# Patient Record
Sex: MALE | Race: WHITE | NOT HISPANIC OR LATINO | Employment: OTHER | URBAN - METROPOLITAN AREA
[De-identification: names, ages, dates, MRNs, and addresses within clinical notes are randomized per-mention and may not be internally consistent; named-entity substitution may affect disease eponyms.]

---

## 2024-08-15 ENCOUNTER — OFFICE VISIT (OUTPATIENT)
Dept: ORTHOPEDIC SURGERY | Facility: CLINIC | Age: 79
End: 2024-08-15
Payer: MEDICARE

## 2024-08-15 VITALS
SYSTOLIC BLOOD PRESSURE: 160 MMHG | BODY MASS INDEX: 27.39 KG/M2 | WEIGHT: 202.2 LBS | DIASTOLIC BLOOD PRESSURE: 68 MMHG | HEIGHT: 72 IN

## 2024-08-15 DIAGNOSIS — M75.112 INCOMPLETE TEAR OF LEFT ROTATOR CUFF, UNSPECIFIED WHETHER TRAUMATIC: Primary | ICD-10-CM

## 2024-08-15 DIAGNOSIS — M19.012 PRIMARY OSTEOARTHRITIS OF LEFT SHOULDER: ICD-10-CM

## 2024-08-15 DIAGNOSIS — M25.512 LEFT SHOULDER PAIN, UNSPECIFIED CHRONICITY: ICD-10-CM

## 2024-08-15 DIAGNOSIS — M19.012 ARTHRITIS OF LEFT ACROMIOCLAVICULAR JOINT: ICD-10-CM

## 2024-08-15 RX ORDER — LEVOCETIRIZINE DIHYDROCHLORIDE 5 MG/1
1 TABLET, FILM COATED ORAL DAILY
COMMUNITY

## 2024-08-15 RX ORDER — LIDOCAINE HYDROCHLORIDE 10 MG/ML
3 INJECTION, SOLUTION EPIDURAL; INFILTRATION; INTRACAUDAL; PERINEURAL
Status: COMPLETED | OUTPATIENT
Start: 2024-08-15 | End: 2024-08-15

## 2024-08-15 RX ORDER — FLUTICASONE PROPIONATE 50 MCG
1 SPRAY, SUSPENSION (ML) NASAL DAILY
COMMUNITY

## 2024-08-15 RX ORDER — FAMOTIDINE 20 MG/1
TABLET, FILM COATED ORAL
COMMUNITY
Start: 2023-09-25

## 2024-08-15 RX ORDER — MOMETASONE FUROATE MONOHYDRATE 50 UG/1
SPRAY, METERED NASAL DAILY
COMMUNITY
Start: 2024-04-03

## 2024-08-15 RX ORDER — WARFARIN SODIUM 2.5 MG/1
TABLET ORAL
COMMUNITY
Start: 2024-01-16

## 2024-08-15 RX ORDER — ALBUTEROL SULFATE 90 UG/1
AEROSOL, METERED RESPIRATORY (INHALATION)
COMMUNITY
Start: 2024-04-22

## 2024-08-15 RX ORDER — TRIAMCINOLONE ACETONIDE 40 MG/ML
40 INJECTION, SUSPENSION INTRA-ARTICULAR; INTRAMUSCULAR
Status: COMPLETED | OUTPATIENT
Start: 2024-08-15 | End: 2024-08-15

## 2024-08-15 RX ORDER — CARVEDILOL 25 MG/1
TABLET ORAL
COMMUNITY
Start: 2024-03-19

## 2024-08-15 RX ORDER — TIMOLOL MALEATE 6.8 MG/ML
SOLUTION/ DROPS OPHTHALMIC 2 TIMES DAILY
COMMUNITY

## 2024-08-15 RX ADMIN — TRIAMCINOLONE ACETONIDE 40 MG: 40 INJECTION, SUSPENSION INTRA-ARTICULAR; INTRAMUSCULAR at 08:36

## 2024-08-15 RX ADMIN — LIDOCAINE HYDROCHLORIDE 3 ML: 10 INJECTION, SOLUTION EPIDURAL; INFILTRATION; INTRACAUDAL; PERINEURAL at 08:36

## 2024-08-15 NOTE — PROGRESS NOTES
[facility] [place] Orthopa[facility] Surgery Clinic Note        Subjective     Pain of the Left Shoulder      HPI    John Lozano is a 79 y.o. male.  Patient is here today see me for the first time in quite some time for evaluation of a left shoulder injury.  Patient tells me that he injured the left shoulder on 2024 when he caught a 12 foot 2 x 4 that was falling.  Knew immediately that he had injured the shoulder.  He is right-hand dominant.  He has anterior and anterior lateral shoulder pain.  No biceps muscle belly pain.  Has done 4-5 sessions with physical therapy.  He says that his pain is unpredictable.  Initially had trouble sleeping but he is now able to sleep.  He is here for further evaluation and treatment at the request of Shala Higgins.        Past Medical History:   Diagnosis Date    Arthritis of neck     Cervical disc disorder     Knee swelling     Periarthritis of shoulder     Rotator cuff syndrome       History reviewed. No pertinent surgical history.   Family History   Problem Relation Age of Onset    Cancer Brother             Cancer Brother              Social History     Socioeconomic History    Marital status:    Tobacco Use    Smoking status: Never    Smokeless tobacco: Never   Vaping Use    Vaping status: Never Used   Substance and Sexual Activity    Alcohol use: Not Currently    Drug use: Never    Sexual activity: Not Currently     Partners: Female     Birth control/protection: Abstinence     Comment: To old      Current Outpatient Medications on File Prior to Visit   Medication Sig Dispense Refill    albuterol sulfate  (90 Base) MCG/ACT inhaler Every 4 To 6 Hours As Needed as needed for Cough as needed for Cough      carvedilol (COREG) 25 MG tablet       Cholecalciferol 50 MCG (2000 UT) tablet Take 1 tablet by mouth Daily.      famotidine (PEPCID) 20 MG tablet       fluticasone (FLONASE) 50 MCG/ACT nasal spray 1 spray into the nostril(s) as directed by provider Daily.   "    levocetirizine (XYZAL) 5 MG tablet Take 1 tablet by mouth Daily.      mometasone (NASONEX) 50 MCG/ACT nasal spray Daily.      Timolol Maleate, Once-Daily, 0.5 % solution 2 (Two) Times a Day.      warfarin (COUMADIN) 2.5 MG tablet        No current facility-administered medications on file prior to visit.      Allergies   Allergen Reactions    Alfuzosin Rash    Amoxicillin Rash    Doxazosin Rash    Sulfa Antibiotics Rash    Tamsulosin Urinary Retention          Review of Systems   Constitutional: Negative.    HENT: Negative.     Eyes: Negative.    Respiratory: Negative.     Cardiovascular: Negative.    Gastrointestinal: Negative.    Endocrine: Negative.    Genitourinary: Negative.    Musculoskeletal:  Positive for arthralgias.   Skin: Negative.    Allergic/Immunologic: Negative.    Neurological: Negative.    Hematological: Negative.    Psychiatric/Behavioral: Negative.          I reviewed the patient's chief complaint, history of present illness, review of systems, past medical history, surgical history, family history, social history, medications and allergy list.        Objective      Physical Exam  /68   Ht 181.6 cm (71.5\")   Wt 91.7 kg (202 lb 3.2 oz)   BMI 27.81 kg/m²     Body mass index is 27.81 kg/m².    General  Mental Status - alert  General Appearance - cooperative, well groomed, not in acute distress  Orientation - Oriented X3  Build & Nutrition - well developed and well nourished  Posture - normal posture  Gait - normal gait       Ortho Exam  Musculoskeletal   Upper Extremity   Left Shoulder       Strength and Tone:    Supraspinatus -4-5 without pain    External Umfnebnp-3-5 without pain    Infraspinatus - 5/5    Subscapularis - 5/5    Deltoid - 5/5     Range of Motion      Left Shoulder:    Internal Rotation: ROM - L4    External Rotation: AROM - 70 degrees    Elevation through flexion: AROM - 140 degrees     AC joint:  non tender to palpation    AC joint:  negative " crossover        Imaging/Studies Reviewed and Interpreted:  Imaging Results (Last 24 Hours)       ** No results found for the last 24 hours. **          We have reviewed and interpreted both an x-ray and an MRI brought in with the patient.  Date of the x-ray and MRI are 730 and 813 both in 2024.  X-ray shows degenerative changes in the glenohumeral joint.  No proximal migration.  Hypertrophic AC joint arthritis is noted.  On the MRI, there is fluid around the long of the biceps.  Arthritis is appreciated as well.  He also has a near full-thickness tear of the supraspinatus.  Subscap appears to be intact.    Assessment    Assessment:  1. Incomplete tear of left rotator cuff, unspecified whether traumatic    2. Left shoulder pain, unspecified chronicity    3. Arthritis of left acromioclavicular joint    4. Primary osteoarthritis of left shoulder        Plan:  Continue over-the-counter medication as needed for discomfort  Left shoulder injury with AC joint arthritis and glenohumeral arthritis--diagnostic and therapeutic injection will be given into the glenohumeral joint today.  Follow-up in about 2 months to assess efficacy.  Will get him back into physical therapy for strengthening of the rotator cuff as well.  Partial and near full-thickness tear of the supraspinatus--physical therapy will be requested.  Patient is can to be reevaluated in 2 months if he is not a lot better, consider modalities to address his rotator cuff.      Procedure Note:    I discussed with the patient the potential benefits of performing a therapeutic injections as well as potential risks including but not limited to infection, swelling, pain, bleeding, bruising, nerve/vessel damage, skin color changes, transient elevation in blood glucose levels, and fat atrophy. After informed consent and after the areas were prepped with chlorhexadine soap, ethyl chloride was used to numb the skin. Via the anterior approach, 3mL of 1% lidocaine followed by  40mg of Kenalog were each injected into the glenohumeral joint of the left shoulder. The patient tolerated the procedure well. There were no complications. A sterile dressing was placed over the injection sites.          Jett Liang MD  08/15/24  08:38 EDT      Dictated Utilizing Dragon Dictation.

## 2024-08-15 NOTE — PROGRESS NOTES
Procedure   - Large Joint Arthrocentesis: L glenohumeral on 8/15/2024 8:36 AM  Indications: pain  Details: 21 G needle, posterior approach  Medications: 3 mL lidocaine PF 1% 1 %; 40 mg triamcinolone acetonide 40 MG/ML  Outcome: tolerated well, no immediate complications  Procedure, treatment alternatives, risks and benefits explained, specific risks discussed. Consent was given by the patient. Immediately prior to procedure a time out was called to verify the correct patient, procedure, equipment, support staff and site/side marked as required. Patient was prepped and draped in the usual sterile fashion.

## 2024-09-03 DIAGNOSIS — M25.512 LEFT SHOULDER PAIN, UNSPECIFIED CHRONICITY: ICD-10-CM

## 2024-09-03 DIAGNOSIS — M19.012 PRIMARY OSTEOARTHRITIS OF LEFT SHOULDER: ICD-10-CM

## 2024-09-03 DIAGNOSIS — M75.112 INCOMPLETE TEAR OF LEFT ROTATOR CUFF, UNSPECIFIED WHETHER TRAUMATIC: ICD-10-CM

## 2024-09-03 DIAGNOSIS — M19.012 ARTHRITIS OF LEFT ACROMIOCLAVICULAR JOINT: Primary | ICD-10-CM

## 2024-10-15 ENCOUNTER — OFFICE VISIT (OUTPATIENT)
Dept: ORTHOPEDIC SURGERY | Facility: CLINIC | Age: 79
End: 2024-10-15
Payer: MEDICARE

## 2024-10-15 VITALS
DIASTOLIC BLOOD PRESSURE: 74 MMHG | BODY MASS INDEX: 28.31 KG/M2 | HEIGHT: 72 IN | SYSTOLIC BLOOD PRESSURE: 130 MMHG | WEIGHT: 209 LBS

## 2024-10-15 DIAGNOSIS — M25.512 LEFT SHOULDER PAIN, UNSPECIFIED CHRONICITY: ICD-10-CM

## 2024-10-15 DIAGNOSIS — M75.112 INCOMPLETE TEAR OF LEFT ROTATOR CUFF, UNSPECIFIED WHETHER TRAUMATIC: Primary | ICD-10-CM

## 2024-10-15 DIAGNOSIS — M19.012 ARTHRITIS OF LEFT ACROMIOCLAVICULAR JOINT: ICD-10-CM

## 2024-10-15 DIAGNOSIS — M19.012 PRIMARY OSTEOARTHRITIS OF LEFT SHOULDER: ICD-10-CM

## 2024-10-15 PROCEDURE — 99212 OFFICE O/P EST SF 10 MIN: CPT | Performed by: ORTHOPAEDIC SURGERY

## 2024-10-15 RX ORDER — NIFEDIPINE 30 MG/1
60 TABLET, EXTENDED RELEASE ORAL DAILY
COMMUNITY
Start: 2024-08-16 | End: 2025-08-16

## 2024-10-15 RX ORDER — DESLORATADINE 5 MG/1
TABLET ORAL
COMMUNITY
Start: 2024-10-10

## 2024-10-15 RX ORDER — TIMOLOL MALEATE 5 MG/ML
SOLUTION/ DROPS OPHTHALMIC
COMMUNITY
Start: 2024-09-19

## 2024-10-15 NOTE — PROGRESS NOTES
"    [facility] [place] [facility] Note        Subjective     CC: Follow-up (2 month follow up-Pain of the Left Shoulder)      HPI    John Lozano is a 79 y.o. male.  Patient is here today for follow-up of his left shoulder.  He has AC joint arthritis and glenohumeral arthritis as well as a partial and near full-thickness tear of the supraspinatus.  He has stopped his physical therapy exercises and seems to be doing quite well overall.  He was injected in the glenohumeral joint at his initial visit on 8/15/2024.    Overall, patient's symptoms are much improved    ROS:    Constiutional:Pt denies fever, chills, nausea, or vomiting.  MSK:as above        Objective      Past Medical History  Past Medical History:   Diagnosis Date    Arthritis of back     Arthritis of neck     Cervical disc disorder     Knee swelling     Low back strain     Lumbosacral disc disease     Periarthritis of shoulder     Rotator cuff syndrome      Social History     Socioeconomic History    Marital status:    Tobacco Use    Smoking status: Never    Smokeless tobacco: Never   Vaping Use    Vaping status: Never Used   Substance and Sexual Activity    Alcohol use: Not Currently     Comment: never drank much, just when dinning out & stopped in my 30's    Drug use: Never    Sexual activity: Not Currently     Partners: Female     Birth control/protection: Abstinence     Comment: To old          Physical Exam  /74   Ht 181.6 cm (71.5\")   Wt 94.8 kg (209 lb)   BMI 28.74 kg/m²     Body mass index is 28.74 kg/m².    Patient is well nourished and well developed.        Ortho Exam  Musculoskeletal   Upper Extremity   Left Shoulder       Strength and Tone:    Supraspinatus -5 out of 5    External Rotators-5/5    Infraspinatus - 5/5    Subscapularis - 5/5    Deltoid - 5/5     Range of Motion      Left Shoulder:    Internal Rotation: ROM - L4    External Rotation: AROM - 70 degrees    Elevation through flexion: AROM - 140 degrees     AC " joint:  non tender to palpation    AC joint:  negative crossover        Imaging/Labs/EMG Reviewed and Interpreted:  Imaging Results (Last 24 Hours)       ** No results found for the last 24 hours. **              Assessment    Assessment:  1. Incomplete tear of left rotator cuff, unspecified whether traumatic    2. Left shoulder pain, unspecified chronicity    3. Arthritis of left acromioclavicular joint    4. Primary osteoarthritis of left shoulder        Plan:  Recommend over the counter anti-inflammatories for pain and/or swelling  Partial-thickness rotator cuff tear in the face of glenohumeral arthritis and AC joint arthritis--patient is better after glenohumeral injection.  I have suggested he continue on his home exercises and modify his activity appropriately.  I will see him back as needed going forward.      Jett Liang MD  10/15/24  12:19 EDT      Dictated Utilizing Dragon Dictation.

## 2025-01-09 ENCOUNTER — OFFICE VISIT (OUTPATIENT)
Dept: ORTHOPEDIC SURGERY | Facility: CLINIC | Age: 80
End: 2025-01-09
Payer: MEDICARE

## 2025-01-09 VITALS
DIASTOLIC BLOOD PRESSURE: 82 MMHG | WEIGHT: 220 LBS | HEIGHT: 71 IN | BODY MASS INDEX: 30.8 KG/M2 | SYSTOLIC BLOOD PRESSURE: 138 MMHG

## 2025-01-09 DIAGNOSIS — M75.101 ROTATOR CUFF SYNDROME OF RIGHT SHOULDER: ICD-10-CM

## 2025-01-09 DIAGNOSIS — M19.011 ARTHRITIS OF RIGHT ACROMIOCLAVICULAR JOINT: ICD-10-CM

## 2025-01-09 DIAGNOSIS — S49.91XA INJURY OF RIGHT SHOULDER, INITIAL ENCOUNTER: ICD-10-CM

## 2025-01-09 DIAGNOSIS — M25.511 RIGHT SHOULDER PAIN, UNSPECIFIED CHRONICITY: Primary | ICD-10-CM

## 2025-01-09 RX ORDER — CYANOCOBALAMIN 500 UG/1
1000 SPRAY, METERED NASAL
COMMUNITY
Start: 2024-08-20

## 2025-01-09 RX ORDER — TRIAMCINOLONE ACETONIDE 40 MG/ML
40 INJECTION, SUSPENSION INTRA-ARTICULAR; INTRAMUSCULAR
Status: COMPLETED | OUTPATIENT
Start: 2025-01-09 | End: 2025-01-09

## 2025-01-09 RX ORDER — LIDOCAINE HYDROCHLORIDE 10 MG/ML
3 INJECTION, SOLUTION EPIDURAL; INFILTRATION; INTRACAUDAL; PERINEURAL
Status: COMPLETED | OUTPATIENT
Start: 2025-01-09 | End: 2025-01-09

## 2025-01-09 RX ADMIN — TRIAMCINOLONE ACETONIDE 40 MG: 40 INJECTION, SUSPENSION INTRA-ARTICULAR; INTRAMUSCULAR at 15:22

## 2025-01-09 RX ADMIN — LIDOCAINE HYDROCHLORIDE 3 ML: 10 INJECTION, SOLUTION EPIDURAL; INFILTRATION; INTRACAUDAL; PERINEURAL at 15:22

## 2025-01-09 NOTE — PROGRESS NOTES
[facility] [place] Orthopa[facility] Surgery Clinic Note        Subjective     Pain and Initial Evaluation of the Right Shoulder      HPI    John Lozano is a 79 y.o. male.  Patient is here today for new problem today regarding his right shoulder.  He says he was moving and a glue shaped dog house to the side 2 days ago.  He went to bed that night and had severe right shoulder pain.  Considered going to the ER.  He hurts at the top of the shoulder and anterolaterally.  Says he feels like he is done something to his rotator cuff.          Past Medical History:   Diagnosis Date    Arthritis of back     Arthritis of neck     Cervical disc disorder     Knee swelling     Low back strain     Lumbosacral disc disease     Periarthritis of shoulder     Rotator cuff syndrome       History reviewed. No pertinent surgical history.   Family History   Problem Relation Age of Onset    Cancer Brother             Cancer Brother              Social History     Socioeconomic History    Marital status:    Tobacco Use    Smoking status: Never    Smokeless tobacco: Never   Vaping Use    Vaping status: Never Used   Substance and Sexual Activity    Alcohol use: Not Currently     Comment: never drank much, just when dinning out & stopped in my 30's    Drug use: Never    Sexual activity: Not Currently     Partners: Female     Birth control/protection: Abstinence     Comment: To old      Current Outpatient Medications on File Prior to Visit   Medication Sig Dispense Refill    albuterol sulfate  (90 Base) MCG/ACT inhaler Every 4 To 6 Hours As Needed as needed for Cough as needed for Cough      carvedilol (COREG) 25 MG tablet       Cholecalciferol 50 MCG (2000 UT) tablet Take 1 tablet by mouth Daily.      Cyanocobalamin 500 MCG/0.1ML solution 0.2 mL.      desloratadine (CLARINEX) 5 MG tablet       famotidine (PEPCID) 20 MG tablet       fluticasone (FLONASE) 50 MCG/ACT nasal spray Administer 1 spray into the nostril(s) as  "directed by provider Daily.      NIFEdipine XL (PROCARDIA XL) 30 MG 24 hr tablet Take 2 tablets by mouth Daily.      timolol (TIMOPTIC) 0.5 % ophthalmic solution       Timolol Maleate, Once-Daily, 0.5 % solution 2 (Two) Times a Day.      warfarin (COUMADIN) 2.5 MG tablet        No current facility-administered medications on file prior to visit.      Allergies   Allergen Reactions    Alfuzosin Rash    Amoxicillin Rash    Doxazosin Rash    Sulfa Antibiotics Rash    Tamsulosin Urinary Retention          Review of Systems   Constitutional: Negative.    HENT: Negative.     Eyes: Negative.    Respiratory: Negative.     Cardiovascular: Negative.    Gastrointestinal: Negative.    Endocrine: Negative.    Genitourinary: Negative.    Musculoskeletal:  Positive for arthralgias.   Skin: Negative.    Allergic/Immunologic: Negative.    Neurological: Negative.    Hematological: Negative.    Psychiatric/Behavioral: Negative.          I reviewed the patient's chief complaint, history of present illness, review of systems, past medical history, surgical history, family history, social history, medications and allergy list.        Objective      Physical Exam  /82   Ht 181.6 cm (71.5\")   Wt 99.8 kg (220 lb)   BMI 30.26 kg/m²     Body mass index is 30.26 kg/m².    General  Mental Status - alert  General Appearance - cooperative, well groomed, not in acute distress  Orientation - Oriented X3  Build & Nutrition - well developed and well nourished  Posture - normal posture  Gait - normal gait       Ortho Exam  Musculoskeletal   Upper Extremity   Right Shoulder       Strength and Tone:    Supraspinatus -4-5 with pain    External Rotators-5/5 with pain    Infraspinatus - 5/5    Subscapularis - 5/5    Deltoid - 5/5     Range of Motion      Right Shoulder:    Internal Rotation: ROM - L4    External Rotation: AROM - 70 degrees    Elevation through flexion: AROM - 140 degrees     AC joint: Mildly tender to palpation    AC joint: " Positive crossover      Imaging/Studies Reviewed and Interpreted:  Imaging Results (Last 24 Hours)       Procedure Component Value Units Date/Time    XR Shoulder 2+ View Right [[id number]] Resulted: 01/09/25 1511     Updated: 01/09/25 1511    Narrative:      Right Shoulder X-Ray    Indication: Pain    Study:  Grashey AP, axillary lateral, and scapular Y views    Comparison: None    Findings:  No acute fractures are visualized  No bony lesions are visualized.  Normal soft tissue appearance  AC joint: Severe hypertrophic joint space narrowing  Glenohumeral joint: Mild joint space narrowing  Acromion type: 1      Impression:    No acute bony abnormalities noted  Type I acromion  Severe hypertrophic AC joint space narrowing              Assessment    Assessment:  1. Right shoulder pain, unspecified chronicity    2. Injury of right shoulder, initial encounter    3. Arthritis of right acromioclavicular joint    4. Rotator cuff syndrome of right shoulder        Plan:  Continue over-the-counter medication as needed for discomfort  Right shoulder injury in the face of underlying AC joint arthritis--because of the acuity, we offered to order an MRI.  He prefers to go with an injection and some home exercises for now.  Will try that and I will see him back in 6 weeks.      Procedure Note:    I discussed with the patient the potential benefits of performing a therapeutic injections as well as potential risks including but not limited to infection, swelling, pain, bleeding, bruising, nerve/vessel damage, skin color changes, transient elevation in blood glucose levels, and fat atrophy. After informed consent and after the areas were prepped with chlorhexadine soap, ethyl chloride was used to numb the skin. Via the posterolateral approach, 3mL of 1% lidocaine followed by 40mg of Kenalog were each injected into the subacromial space of the right shoulder. The patient tolerated the procedure well. There were no complications. A  sterile dressing was placed over the injection sites.          Jett Liang MD  01/09/25  15:25 EST      Dictated Utilizing Dragon Dictation.

## 2025-01-09 NOTE — PROGRESS NOTES
Procedure   - Large Joint Arthrocentesis: R subacromial bursa on 1/9/2025 3:22 PM  Indications: pain  Details: 25 G needle, posterior approach  Medications: 3 mL lidocaine PF 1% 1 %; 40 mg triamcinolone acetonide 40 MG/ML  Outcome: tolerated well, no immediate complications  Procedure, treatment alternatives, risks and benefits explained, specific risks discussed. Consent was given by the patient. Immediately prior to procedure a time out was called to verify the correct patient, procedure, equipment, support staff and site/side marked as required. Patient was prepped and draped in the usual sterile fashion.

## 2025-02-20 ENCOUNTER — OFFICE VISIT (OUTPATIENT)
Dept: ORTHOPEDIC SURGERY | Facility: CLINIC | Age: 80
End: 2025-02-20
Payer: MEDICARE

## 2025-02-20 VITALS
DIASTOLIC BLOOD PRESSURE: 74 MMHG | BODY MASS INDEX: 30.21 KG/M2 | WEIGHT: 215.8 LBS | SYSTOLIC BLOOD PRESSURE: 118 MMHG | HEIGHT: 71 IN

## 2025-02-20 DIAGNOSIS — M25.511 RIGHT SHOULDER PAIN, UNSPECIFIED CHRONICITY: Primary | ICD-10-CM

## 2025-02-20 DIAGNOSIS — S49.91XD INJURY OF RIGHT SHOULDER, SUBSEQUENT ENCOUNTER: ICD-10-CM

## 2025-02-20 NOTE — PROGRESS NOTES
"    [facility] [place] Orthopedic Surgery Clinic Note        Subjective     CC: Follow-up (6 week follow up---Right shoulder pain, unspecified chronicity)      HPI    John Lozano is a 79 y.o. male.  Patient returns the office today for follow-up of his right shoulder issue.  Last seen on 1/9/2025 and he was injected subacromially.  He says this helped him but then he sustained another injury where he reached out suddenly and has now had a recurrence of his discomfort.    Overall, patient's symptoms are as above    ROS:    Constiutional:Pt denies fever, chills, nausea, or vomiting.  MSK:as above        Objective      Past Medical History  Past Medical History:   Diagnosis Date    Arthritis of back     Arthritis of neck     Cervical disc disorder     Knee swelling     Low back strain     Lumbosacral disc disease     Periarthritis of shoulder     Rotator cuff syndrome      Social History     Socioeconomic History    Marital status:    Tobacco Use    Smoking status: Never    Smokeless tobacco: Never   Vaping Use    Vaping status: Never Used   Substance and Sexual Activity    Alcohol use: Not Currently     Comment: never drank much, just when dinning out & stopped in my 30's    Drug use: Never    Sexual activity: Not Currently     Partners: Female     Birth control/protection: Abstinence     Comment: To old          Physical Exam  /74   Ht 181.6 cm (71.5\")   Wt 97.9 kg (215 lb 12.8 oz)   BMI 29.68 kg/m²     Body mass index is 29.68 kg/m².    Patient is well nourished and well developed.        Ortho Exam  Musculoskeletal   Upper Extremity   Right Shoulder       Strength and Tone:    Supraspinatus -4-5 with pain    External Rotators-5/5 with pain    Infraspinatus - 5/5    Subscapularis - 5/5    Deltoid - 5/5     Range of Motion      Right Shoulder:    Internal Rotation: ROM - L4    External Rotation: AROM - 70 degrees    Elevation through flexion: AROM - 140 degrees     AC joint:  non tender to palpation    AC " joint:  negative crossover      Imaging/Labs/EMG Reviewed and Interpreted:  Imaging Results (Last 24 Hours)       ** No results found for the last 24 hours. **              Assessment    Assessment:  1. Right shoulder pain, unspecified chronicity    2. Injury of right shoulder, subsequent encounter        Plan:  Recommend over the counter anti-inflammatories for pain and/or swelling  Right shoulder injury--at this point, patient will need an MRI to evaluate the extent of his injury.  He has a significant VTE history and we need to remember this going forward as we plan treatment.      Jett Liang MD  02/20/25  17:06 EST      Dictated Utilizing Dragon Dictation.

## 2025-03-08 ENCOUNTER — HOSPITAL ENCOUNTER (OUTPATIENT)
Dept: MRI IMAGING | Facility: HOSPITAL | Age: 80
Discharge: HOME | End: 2025-03-08
Payer: MEDICARE

## 2025-03-08 DIAGNOSIS — S49.91XD INJURY OF RIGHT SHOULDER, SUBSEQUENT ENCOUNTER: ICD-10-CM

## 2025-03-08 DIAGNOSIS — M25.511 RIGHT SHOULDER PAIN, UNSPECIFIED CHRONICITY: ICD-10-CM

## 2025-03-08 PROCEDURE — 73221 MRI JOINT UPR EXTREM W/O DYE: CPT

## 2025-03-13 ENCOUNTER — OFFICE VISIT (OUTPATIENT)
Dept: ORTHOPEDIC SURGERY | Facility: CLINIC | Age: 80
End: 2025-03-13
Payer: MEDICARE

## 2025-03-13 VITALS
HEIGHT: 72 IN | WEIGHT: 215 LBS | SYSTOLIC BLOOD PRESSURE: 156 MMHG | BODY MASS INDEX: 29.12 KG/M2 | DIASTOLIC BLOOD PRESSURE: 88 MMHG

## 2025-03-13 DIAGNOSIS — S46.011D STRAIN OF TENDON OF RIGHT ROTATOR CUFF, SUBSEQUENT ENCOUNTER: Primary | ICD-10-CM

## 2025-03-13 DIAGNOSIS — S46.011D TRAUMATIC COMPLETE TEAR OF RIGHT ROTATOR CUFF, SUBSEQUENT ENCOUNTER: Primary | ICD-10-CM

## 2025-03-13 NOTE — PROGRESS NOTES
"    [facility] [place] Orthopedic Surgery Clinic Note        Subjective     CC: Follow-up (3 week follow up -Right shoulder pain, unspecified chronicity -MRI preformed 3/8/25/)      HPI    John Lozano is a 79 y.o. male.  Patient returns the office today for follow-up after the MRI of his right shoulder.  Continues to complain of pain more than weakness.    Overall, patient's symptoms are as above    ROS:    Constiutional:Pt denies fever, chills, nausea, or vomiting.  MSK:as above        Objective      Past Medical History  Past Medical History:   Diagnosis Date    Arthritis of back     Arthritis of neck     Cervical disc disorder     Frozen shoulder     Knee swelling     Low back strain     Lumbosacral disc disease     Periarthritis of shoulder     Rotator cuff syndrome      Social History     Socioeconomic History    Marital status:    Tobacco Use    Smoking status: Never    Smokeless tobacco: Never   Vaping Use    Vaping status: Never Used   Substance and Sexual Activity    Alcohol use: Not Currently     Comment: never drank much, just when dinning out & stopped in my 30's    Drug use: Never    Sexual activity: Not Currently     Partners: Female     Birth control/protection: Abstinence     Comment: To old          Physical Exam  /88   Ht 181.6 cm (71.5\")   Wt 97.5 kg (215 lb)   BMI 29.57 kg/m²     Body mass index is 29.57 kg/m².    Patient is well nourished and well developed.        Ortho Exam  Musculoskeletal   Upper Extremity   Right Shoulder       Strength and Tone:    Supraspinatus -4 out of 5 with pain    External Rotators-5/5    Infraspinatus - 5/5    Subscapularis - 5/5    Deltoid - 5/5     Range of Motion      Right Shoulder:    Internal Rotation: ROM - L4    External Rotation: AROM - 70 degrees    Elevation through flexion: AROM - 140 degrees     AC joint:  non tender to palpation    AC joint:  negative crossover      Imaging/Labs/EMG Reviewed and Interpreted:  Imaging Results (Last 24 Hours)  "      ** No results found for the last 24 hours. **            MRI Shoulder Right Without Contrast  Narrative: MRI SHOULDER RIGHT WO CONTRAST    Date of Exam: 3/8/2025 5:01 PM EST    Indication: Shoulder pain, rotator cuff disorder suspected, xray done  pain.     Comparison: Shoulder x-ray 1/9/2025    Technique:  Routine multiplanar/multisequence images of the right shoulder were obtained without contrast administration.      Findings:  Rotator cuff:  Supraspinatus and infraspinatus tendon: There is full-thickness full width complete tearing of the infraspinatus tendon. There is full-thickness near full width tearing of the supraspinatus tendon. A few poor quality far anterior supraspinatus tendon   fibers are intact however majority is torn. This is at the insertion site. This tear measures 4.1 cm mediolateral by 3.8 cm anterior to posterior. There is medial retraction musculotendinous junction measuring 1.6 cm. There is moderate to severe atrophy   infraspinatus muscle with mild edema. Mild supraspinatus muscle atrophy.  Subscapularis tendon: There is an intrasubstance tear measuring 1.4 x 1.2 cm. This involves about 50% tendon thickness. There is severe tendinosis. Moderate muscle atrophy without significant edema.  Teres minor tendon: No tendon abnormality. No significant muscle atrophy.    Glenohumeral joint:  Humeral head is superiorly located in the glenoid. There is a large glenohumeral joint effusion with synovitis. There is severe joint space narrowing. Inferior joint capsule has normal thickness and signal intensity.    Labrum:  Diffuse degenerative tearing of the labrum. No large paralabral cysts.    Biceps tendon:  The long head biceps tendon is abnormal. It is intact proximally but thickened and has high signal intensity consistent with partial-thickness interstitial tearing.    Acromioclavicular Joint:  Severe degenerative change. Small joint effusion. No abnormal bone marrow edema. No os acromiale. No  significant lateral downsloping of the acromion.    Bone:  No fractures or aggressive osseous lesions. Bone marrow signal intensity is normal.    Miscellaneous:  Moderate amount subacromial subdeltoid fluid. No pathologically enlarged axillary lymph nodes. Deltoid muscle has normal size and signal intensity.  Impression: 1.There is a large full-thickness full width tear infraspinatus tendon and full-thickness near full width tearing supraspinatus tendon. There is medial retraction musculotendinous junction and moderate to severe infraspinatus muscle atrophy with mild   edema. There is mild supraspinatus muscle atrophy.  2.There is a moderate size, partial-thickness intrasubstance tear subscapularis tendon with moderate muscle atrophy.  3.There is severe arthritis glenohumeral joint with large joint effusion and synovitis.  4.There is tendinosis and partial-thickness interstitial tearing of the long head biceps tendon.  5.Severe arthritis acromioclavicular joint.  6.Moderate amount subacromial subdeltoid bursal fluid.    Electronically Signed: Deena Figueroa MD    3/12/2025 8:42 AM EDT    Workstation ID: [id number]      We have reviewed and interpreted an MRI of the patient's right shoulder.  Patient appears to have at least an upper subscap tear.  Abundant fluid around the long of the biceps.  Large supra and infraspinatus tear with retraction and proximal migration of the numeral head.  Not a lot of arthritis that I can detect or atrophy of the supraspinatus.    Assessment    Assessment:  1. Traumatic complete tear of right rotator cuff, subsequent encounter        Plan:  Recommend over the counter anti-inflammatories for pain and/or swelling  Right rotator cuff tear--patient is on Coumadin and has a [place] filter.  Would like to try and avoid surgery if possible.  We talked about the pros and cons of surgical intervention.  Will plan to be for a course of physical therapy to see if strengthening will help him avoid  surgical intervention.  Certainly that is an option going forward but hopefully this will be necessary.  Hopefully this will not be necessary.  He will do this at the [facility].  See him back in 2 months and we will see how he is doing overall.      Jett Liang MD  03/13/25  13:47 EDT      Dictated Utilizing Dragon Dictation.

## 2025-03-19 DIAGNOSIS — S46.011D TRAUMATIC TEAR OF RIGHT ROTATOR CUFF, SUBSEQUENT ENCOUNTER: Primary | ICD-10-CM

## 2025-04-10 ENCOUNTER — TELEPHONE (OUTPATIENT)
Dept: ORTHOPEDIC SURGERY | Facility: CLINIC | Age: 80
End: 2025-04-10

## 2025-04-10 NOTE — TELEPHONE ENCOUNTER
The [facility] received a fax that requires your attention. The document has been indexed to the patient’s chart for your review.      Reason for sending: RECEIVED PLAN OF CARE FROM JESSICA CASTAÑEDA THAT NEEDS TO BE SIGNED BY THE PROVIDER    Documents Description: PLAN OF CARE-JESSICA CASTAÑEDA-4/7/2025    Name of Sender: ZA JAMES    Date Indexed: 4/10/2025

## 2025-04-24 ENCOUNTER — OFFICE VISIT (OUTPATIENT)
Dept: ORTHOPEDIC SURGERY | Facility: CLINIC | Age: 80
End: 2025-04-24
Payer: MEDICARE

## 2025-04-24 VITALS
SYSTOLIC BLOOD PRESSURE: 140 MMHG | BODY MASS INDEX: 30.1 KG/M2 | WEIGHT: 215 LBS | HEIGHT: 71 IN | DIASTOLIC BLOOD PRESSURE: 76 MMHG

## 2025-04-24 DIAGNOSIS — S46.011D TRAUMATIC TEAR OF RIGHT ROTATOR CUFF, SUBSEQUENT ENCOUNTER: Primary | ICD-10-CM

## 2025-04-24 RX ORDER — LIDOCAINE HYDROCHLORIDE 10 MG/ML
3 INJECTION, SOLUTION EPIDURAL; INFILTRATION; INTRACAUDAL; PERINEURAL
Status: COMPLETED | OUTPATIENT
Start: 2025-04-24 | End: 2025-04-24

## 2025-04-24 RX ORDER — TRIAMCINOLONE ACETONIDE 40 MG/ML
40 INJECTION, SUSPENSION INTRA-ARTICULAR; INTRAMUSCULAR
Status: COMPLETED | OUTPATIENT
Start: 2025-04-24 | End: 2025-04-24

## 2025-04-24 RX ADMIN — LIDOCAINE HYDROCHLORIDE 3 ML: 10 INJECTION, SOLUTION EPIDURAL; INFILTRATION; INTRACAUDAL; PERINEURAL at 09:43

## 2025-04-24 RX ADMIN — TRIAMCINOLONE ACETONIDE 40 MG: 40 INJECTION, SUSPENSION INTRA-ARTICULAR; INTRAMUSCULAR at 09:43

## 2025-04-24 NOTE — PROGRESS NOTES
Procedure   - Large Joint Arthrocentesis: R subacromial bursa on 4/24/2025 9:43 AM  Indications: pain  Details: 21 G needle, posterior approach  Medications: 40 mg triamcinolone acetonide 40 MG/ML; 3 mL lidocaine PF 1% 1 %  Outcome: tolerated well, no immediate complications  Procedure, treatment alternatives, risks and benefits explained, specific risks discussed. Consent was given by the patient. Immediately prior to procedure a time out was called to verify the correct patient, procedure, equipment, support staff and site/side marked as required. Patient was prepped and draped in the usual sterile fashion.

## 2025-04-24 NOTE — PROGRESS NOTES
"    [facility] [place] [facility] Note        Subjective     CC: Follow-up (6 week follow up - Traumatic complete tear of right rotator cuff)      HPI    John Lozano is a 80 y.o. male.  Patient returns the office today for follow-up of his right rotator cuff tear.  He tells me that he overdid it on his physical therapy exercises and his shoulders flared up.  Otherwise it was getting better.    Overall, patient's symptoms are as above    ROS:    Constiutional:Pt denies fever, chills, nausea, or vomiting.  MSK:as above        Objective      Past Medical History  Past Medical History:   Diagnosis Date    Arthritis of back     Arthritis of neck     Cervical disc disorder     Frozen shoulder     Knee swelling     Low back strain     Lumbosacral disc disease     Periarthritis of shoulder     Rotator cuff syndrome      Social History     Socioeconomic History    Marital status:    Tobacco Use    Smoking status: Never    Smokeless tobacco: Never   Vaping Use    Vaping status: Never Used   Substance and Sexual Activity    Alcohol use: Not Currently     Comment: never drank much, just when dinning out & stopped in my 30's    Drug use: Never    Sexual activity: Not Currently     Partners: Female     Birth control/protection: Abstinence     Comment: To old          Physical Exam  /76   Ht 181.6 cm (71.5\")   Wt 97.5 kg (215 lb)   BMI 29.57 kg/m²     Body mass index is 29.57 kg/m².    Patient is well nourished and well developed.        Ortho Exam  Musculoskeletal   Upper Extremity   Right Shoulder       Strength and Tone:    Supraspinatus -4 out of 5    External Rotators-5/5    Infraspinatus - 5/5    Subscapularis - 5/5    Deltoid - 5/5     Range of Motion      Right Shoulder:    Internal Rotation: ROM - L4    External Rotation: AROM - 70 degrees    Elevation through flexion: AROM - 140 degrees     AC joint:  non tender to palpation    AC joint:  negative crossover      Imaging/Labs/EMG Reviewed and " Interpreted:  Imaging Results (Last 24 Hours)       ** No results found for the last 24 hours. **              Assessment    Assessment:  1. Traumatic tear of right rotator cuff, subsequent encounter        Plan:  Recommend over the counter anti-inflammatories for pain and/or swelling  Right rotator cuff tear--flared up recently.  Subacromial junction will be given today.  This will be his second subacromial injection.  Check him back in 4 months to see how he is doing overall.      Procedure Note:    I discussed with the patient the potential benefits of performing a therapeutic injections as well as potential risks including but not limited to infection, swelling, pain, bleeding, bruising, nerve/vessel damage, skin color changes, transient elevation in blood glucose levels, elevated blood pressure and fat atrophy. After informed consent and after the areas were prepped with chlorhexadine soap, ethyl chloride was used to numb the skin. Via the posterolateral approach, 3mL of 1% lidocaine followed by 40mg of Kenalog were each injected into the subacromial space of the right shoulder. The patient tolerated the procedure well. There were no complications. A sterile dressing was placed over the injection sites.        Jett Liang MD  04/24/25  09:53 EDT      Dictated Utilizing Dragon Dictation.

## 2025-08-26 ENCOUNTER — OFFICE VISIT (OUTPATIENT)
Dept: ORTHOPEDIC SURGERY | Facility: CLINIC | Age: 80
End: 2025-08-26
Payer: MEDICARE

## 2025-08-26 VITALS
HEIGHT: 72 IN | WEIGHT: 215 LBS | BODY MASS INDEX: 29.12 KG/M2 | SYSTOLIC BLOOD PRESSURE: 128 MMHG | DIASTOLIC BLOOD PRESSURE: 84 MMHG

## 2025-08-26 DIAGNOSIS — S46.011D TRAUMATIC TEAR OF RIGHT ROTATOR CUFF, SUBSEQUENT ENCOUNTER: Primary | ICD-10-CM

## 2025-08-26 DIAGNOSIS — M19.011 ARTHRITIS OF RIGHT ACROMIOCLAVICULAR JOINT: ICD-10-CM

## 2025-08-26 RX ORDER — LIDOCAINE HYDROCHLORIDE 10 MG/ML
1 INJECTION, SOLUTION EPIDURAL; INFILTRATION; INTRACAUDAL; PERINEURAL
Status: COMPLETED | OUTPATIENT
Start: 2025-08-26 | End: 2025-08-26

## 2025-08-26 RX ORDER — DUTASTERIDE 0.5 MG/1
0.5 CAPSULE, LIQUID FILLED ORAL DAILY
COMMUNITY
Start: 2025-06-04

## 2025-08-26 RX ORDER — DEXAMETHASONE SODIUM PHOSPHATE 4 MG/ML
2 INJECTION, SOLUTION INTRA-ARTICULAR; INTRALESIONAL; INTRAMUSCULAR; INTRAVENOUS; SOFT TISSUE
Status: COMPLETED | OUTPATIENT
Start: 2025-08-26 | End: 2025-08-26

## 2025-08-26 RX ADMIN — DEXAMETHASONE SODIUM PHOSPHATE 2 MG: 4 INJECTION, SOLUTION INTRA-ARTICULAR; INTRALESIONAL; INTRAMUSCULAR; INTRAVENOUS; SOFT TISSUE at 15:40

## 2025-08-26 RX ADMIN — LIDOCAINE HYDROCHLORIDE 1 ML: 10 INJECTION, SOLUTION EPIDURAL; INFILTRATION; INTRACAUDAL; PERINEURAL at 15:40
